# Patient Record
Sex: FEMALE | Race: WHITE | NOT HISPANIC OR LATINO | Employment: OTHER | URBAN - METROPOLITAN AREA
[De-identification: names, ages, dates, MRNs, and addresses within clinical notes are randomized per-mention and may not be internally consistent; named-entity substitution may affect disease eponyms.]

---

## 2020-01-31 ENCOUNTER — NEW PATIENT COMPREHENSIVE (OUTPATIENT)
Dept: URBAN - METROPOLITAN AREA CLINIC 39 | Facility: CLINIC | Age: 70
End: 2020-01-31

## 2020-01-31 DIAGNOSIS — H26.491: ICD-10-CM

## 2020-01-31 DIAGNOSIS — H43.813: ICD-10-CM

## 2020-01-31 DIAGNOSIS — H52.223: ICD-10-CM

## 2020-01-31 DIAGNOSIS — H04.123: ICD-10-CM

## 2020-01-31 PROCEDURE — 92015 DETERMINE REFRACTIVE STATE: CPT

## 2020-01-31 PROCEDURE — 92004 COMPRE OPH EXAM NEW PT 1/>: CPT

## 2020-01-31 ASSESSMENT — TONOMETRY
OD_IOP_MMHG: 12
OS_IOP_MMHG: 13

## 2020-01-31 ASSESSMENT — VISUAL ACUITY
OS_SC: 20/30-2
OD_SC: 20/20
OS_SC: J1-
OD_SC: J1

## 2020-03-13 NOTE — PATIENT DISCUSSION
Lashes epilated at slit lamp today with forceps, without incident. Verbal consent obtained. Advised patient to call our office with decreased vision or increased symptoms.

## 2020-03-13 NOTE — PROCEDURE NOTE: CLINICAL
PROCEDURE NOTE: Epilation Right Upper Lid. Diagnosis: Trichiasis without Entropion. Prior to treatment, risks/benefits/alternatives discussed including infection, loss of vision, hemorrhage, cataract, glaucoma, retinal tears or detachment. 2 Aberrant lashes removed from right upper lid(s) using microforcep. Patient tolerated procedure well. There were no complications. Post-op instructions given. Umang Luke

## 2020-03-13 NOTE — PATIENT DISCUSSION
Patient reported with twitching OD. Reassured patient that it is inconsequential. It is likely due to stress and to monitor stressors.

## 2021-02-20 ENCOUNTER — EST. PATIENT EMERGENCY (OUTPATIENT)
Dept: URBAN - METROPOLITAN AREA CLINIC 38 | Facility: CLINIC | Age: 71
End: 2021-02-20

## 2021-02-20 DIAGNOSIS — H01.02A: ICD-10-CM

## 2021-02-20 DIAGNOSIS — H01.02B: ICD-10-CM

## 2021-02-20 DIAGNOSIS — H04.123: ICD-10-CM

## 2021-02-20 DIAGNOSIS — H00.025: ICD-10-CM

## 2021-02-20 PROCEDURE — 99213 OFFICE O/P EST LOW 20 MIN: CPT

## 2021-02-20 RX ORDER — AZITHROMYCIN DIHYDRATE 250 MG/1: TABLET, FILM COATED ORAL

## 2021-02-20 RX ORDER — ERYTHROMYCIN 5 MG/G: OINTMENT OPHTHALMIC EVERY EVENING

## 2021-02-20 ASSESSMENT — VISUAL ACUITY
OS_SC: 20/25
OD_SC: 20/20-2

## 2021-02-20 ASSESSMENT — TONOMETRY
OD_IOP_MMHG: 13
OS_IOP_MMHG: 12

## 2021-04-15 ENCOUNTER — ESTABLISHED COMPREHENSIVE EXAM (OUTPATIENT)
Dept: URBAN - METROPOLITAN AREA CLINIC 39 | Facility: CLINIC | Age: 71
End: 2021-04-15

## 2021-04-15 DIAGNOSIS — H43.813: ICD-10-CM

## 2021-04-15 DIAGNOSIS — H52.223: ICD-10-CM

## 2021-04-15 DIAGNOSIS — H01.02A: ICD-10-CM

## 2021-04-15 DIAGNOSIS — H26.491: ICD-10-CM

## 2021-04-15 DIAGNOSIS — H01.02B: ICD-10-CM

## 2021-04-15 DIAGNOSIS — H04.123: ICD-10-CM

## 2021-04-15 PROCEDURE — 92015 DETERMINE REFRACTIVE STATE: CPT

## 2021-04-15 PROCEDURE — 92499OP2 OPTOMAP RETINAL SCREENING BOTH EYES

## 2021-04-15 PROCEDURE — 92014 COMPRE OPH EXAM EST PT 1/>: CPT

## 2021-04-15 ASSESSMENT — VISUAL ACUITY
OD_SC: 20/25-2
OS_SC: 20/30-2
OS_SC: J1
OD_SC: J1

## 2021-04-15 ASSESSMENT — TONOMETRY
OS_IOP_MMHG: 13
OD_IOP_MMHG: 13

## 2022-04-19 ENCOUNTER — PREPPED CHART (OUTPATIENT)
Dept: URBAN - METROPOLITAN AREA CLINIC 39 | Facility: CLINIC | Age: 72
End: 2022-04-19

## 2024-04-25 ENCOUNTER — COMPREHENSIVE EXAM (OUTPATIENT)
Dept: URBAN - METROPOLITAN AREA CLINIC 39 | Facility: CLINIC | Age: 74
End: 2024-04-25

## 2024-04-25 DIAGNOSIS — H35.363: ICD-10-CM

## 2024-04-25 DIAGNOSIS — H52.223: ICD-10-CM

## 2024-04-25 DIAGNOSIS — H01.02B: ICD-10-CM

## 2024-04-25 DIAGNOSIS — H04.123: ICD-10-CM

## 2024-04-25 DIAGNOSIS — H43.813: ICD-10-CM

## 2024-04-25 DIAGNOSIS — H26.491: ICD-10-CM

## 2024-04-25 DIAGNOSIS — H01.02A: ICD-10-CM

## 2024-04-25 PROCEDURE — 92015 DETERMINE REFRACTIVE STATE: CPT

## 2024-04-25 PROCEDURE — 92004 COMPRE OPH EXAM NEW PT 1/>: CPT

## 2024-04-25 ASSESSMENT — VISUAL ACUITY
OD_PH: 20/25-2
OD_BAT: 20/200
OS_SC: J2
OS_PH: 20/25-1
OD_SC: J1
OS_SC: 20/30-2
OD_SC: 20/30-2

## 2024-04-25 ASSESSMENT — TONOMETRY
OD_IOP_MMHG: 10
OS_IOP_MMHG: 10

## 2025-04-23 ENCOUNTER — COMPREHENSIVE EXAM (OUTPATIENT)
Age: 75
End: 2025-04-23

## 2025-04-23 DIAGNOSIS — H26.491: ICD-10-CM

## 2025-04-23 DIAGNOSIS — H01.02A: ICD-10-CM

## 2025-04-23 DIAGNOSIS — H43.813: ICD-10-CM

## 2025-04-23 DIAGNOSIS — H52.223: ICD-10-CM

## 2025-04-23 DIAGNOSIS — H04.123: ICD-10-CM

## 2025-04-23 DIAGNOSIS — H01.02B: ICD-10-CM

## 2025-04-23 DIAGNOSIS — H35.3131: ICD-10-CM

## 2025-04-23 DIAGNOSIS — H35.363: ICD-10-CM

## 2025-04-23 PROCEDURE — 92015 DETERMINE REFRACTIVE STATE: CPT

## 2025-04-23 PROCEDURE — 92014 COMPRE OPH EXAM EST PT 1/>: CPT

## 2025-04-23 PROCEDURE — 92250E RETINAL SCREENING, ELECTIVE
